# Patient Record
Sex: FEMALE | Race: OTHER | URBAN - METROPOLITAN AREA
[De-identification: names, ages, dates, MRNs, and addresses within clinical notes are randomized per-mention and may not be internally consistent; named-entity substitution may affect disease eponyms.]

---

## 2020-02-23 ENCOUNTER — EMERGENCY (EMERGENCY)
Facility: HOSPITAL | Age: 70
LOS: 1 days | Discharge: HOME | End: 2020-02-23
Attending: EMERGENCY MEDICINE | Admitting: EMERGENCY MEDICINE
Payer: SELF-PAY

## 2020-02-23 VITALS
DIASTOLIC BLOOD PRESSURE: 78 MMHG | HEIGHT: 66 IN | OXYGEN SATURATION: 98 % | RESPIRATION RATE: 18 BRPM | WEIGHT: 132.94 LBS | SYSTOLIC BLOOD PRESSURE: 146 MMHG | HEART RATE: 64 BPM | TEMPERATURE: 98 F

## 2020-02-23 LAB — GLUCOSE BLDC GLUCOMTR-MCNC: 103 MG/DL — HIGH (ref 70–99)

## 2020-02-23 PROCEDURE — 99283 EMERGENCY DEPT VISIT LOW MDM: CPT

## 2020-02-23 PROCEDURE — 99053 MED SERV 10PM-8AM 24 HR FAC: CPT

## 2020-02-23 RX ORDER — METFORMIN HYDROCHLORIDE 850 MG/1
500 TABLET ORAL ONCE
Refills: 0 | Status: COMPLETED | OUTPATIENT
Start: 2020-02-23 | End: 2020-02-23

## 2020-02-23 NOTE — ED PROVIDER NOTE - NSFOLLOWUPCLINICS_GEN_ALL_ED_FT
[facility]  Family Medicine  [address] [place] [id number]  Phone: [phone]  Fax:   Follow Up Time:

## 2020-02-23 NOTE — ED PROVIDER NOTE - OBJECTIVE STATEMENT
pt is visiting from [place] and ran out of her metformin 30 days ago, today states that she saw blood in her R eye so comes to ER, no pain, no visual changes.

## 2020-02-23 NOTE — ED ADULT TRIAGE NOTE - CHIEF COMPLAINT QUOTE
Pt c/o right eye redness and left arm pain. Pt denies fever/chills, cough. Denies headache, cp, or sob.

## 2020-02-23 NOTE — ED PROVIDER NOTE - NSFOLLOWUPINSTRUCTIONS_ED_ALL_ED_FT
Hemorragia subconjuntival    LO QUE NECESITA SABER:    Se produce ezio hemorragia subconjuntival cuando se acumula stephanie debajo de la conjuntiva del annabelle. La conjuntiva es el revestimiento transparente que cubre la parte kenyon del annabelle. La stephanie proviene de un vaso sanguíneo que se ha roto debajo de la conjuntiva.    INSTRUCCIONES SOBRE EL JUSTICE HOSPITALARIA:    Cuidado del annabelle:    Compresas frías o tibias:Use ezio compresa fría javan las primeras 24 horas. Pregunte con qué frecuencia y javan cuánto tiempo debería aplicársela cada vez. Después de las primeras 24 horas, póngase ezio compresa tibia en el annabelle. Alexander esto 3 veces al día por unos 10 a 15 minutos cada vez.      Gotas oculares:Puede que necesite lágrimas artificiales para mantener los ojos húmedos. Use las gotas ruy gabriele le indiquen.Pasos 1 2 3 4         Programe ezio nicholas de seguimiento con wells médico u oftalmólogo según indicaciones:Anote cristal preguntas para que se acuerde de hacerlas javan cristal visitas.    Comuníquese con wells médico o wells especialista del annabelle si:    Todavía tiene la rashaun aimee en el annabelle después de 3 semanas.      Tiene otra hemorragia subconjuntival.      Tiene hemorragia subconjuntival en ambos ojos.      Usted tiene preguntas o inquietudes acerca de wells condición o cuidado.    Regrese a la keaton de emergencias si:    Le duele el annabelle o tiene sensibilidad a la shefali.      Wells visión cambia.      Wells annabelle supura un líquido garcia o amarillo.

## 2020-02-24 PROCEDURE — 82962 GLUCOSE BLOOD TEST: CPT

## 2020-02-24 PROCEDURE — 99283 EMERGENCY DEPT VISIT LOW MDM: CPT

## 2020-02-24 RX ORDER — METFORMIN HYDROCHLORIDE 850 MG/1
1 TABLET ORAL
Qty: 60 | Refills: 0
Start: 2020-02-24 | End: 2020-03-24

## 2020-02-24 RX ADMIN — METFORMIN HYDROCHLORIDE 500 MILLIGRAM(S): 850 TABLET ORAL at 00:04

## 2020-02-24 NOTE — ED ADULT NURSE NOTE - OBJECTIVE STATEMENT
Pt a&ox3 came to ED c/o right eye redness. Pt denies itchiness, discharge, visual changes. Denies sneezing/coughing. Pt also states she is type 2 diabetic and ran out of metformin prescription, she is visiting from [facility].

## 2020-02-29 DIAGNOSIS — Z76.0 ENCOUNTER FOR ISSUE OF REPEAT PRESCRIPTION: ICD-10-CM

## 2022-08-19 ENCOUNTER — EMERGENCY (EMERGENCY)
Facility: HOSPITAL | Age: 72
LOS: 1 days | Discharge: HOME | End: 2022-08-19
Attending: EMERGENCY MEDICINE
Payer: MEDICAID

## 2022-08-19 VITALS
TEMPERATURE: 98 F | HEIGHT: 67 IN | RESPIRATION RATE: 20 BRPM | HEART RATE: 68 BPM | WEIGHT: 154.32 LBS | SYSTOLIC BLOOD PRESSURE: 128 MMHG | DIASTOLIC BLOOD PRESSURE: 72 MMHG | OXYGEN SATURATION: 98 %

## 2022-08-19 PROCEDURE — 99285 EMERGENCY DEPT VISIT HI MDM: CPT | Mod: 25

## 2022-08-19 PROCEDURE — 93010 ELECTROCARDIOGRAM REPORT: CPT

## 2022-08-19 NOTE — ED ADULT TRIAGE NOTE - AS HEIGHT TYPE
54-year-old female with uncontrolled hypertension, dyslipidemia, history of diverticulitis, anxiety, GERD, probable cognitive impairment, presents with word-finding difficulties, confusion, and left upper extremity weakness  Patient with word-finding difficulty and reported confusion prior to presentation, as well as left upper extremity weakness as reported by EMS  On examination in the ED, patient had mild speech difficulty with a single paraphasic error, could not state the current month or year, but had no weakness or sensory abnormality  Patient's baseline unclear as reports from patient's daughters varied  It seems she may have some difficulty with word finding at times, and may be developing some degree of cognitive impairment/forgetfulness, prompting her to move in with her daughter  Etiology of symptoms hypertensive encephalopathy versus TIA  Blood pressure on arrival was 204/99, and as high as 221 systolic in the ED  MRI brain demonstrated no acute changes  Did demonstrate scattered chronic small vessel cerebrovascular ischemic disease  CT head demonstrated no acute changes  CTA head/neck demonstrated no flow consequential stenosis or LVO  A1c 5 1  LDL 99  Patient was not on any antiplatelet or anticoagulant at baseline  Plan:  -2D echocardiogram with bubble study pending  If unremarkable, no further inpatient neurologic recommendations   -loaded with aspirin 325 mg in the ED  Continue 81 mg aspirin daily     -continue atorvastatin 40 mg   -Telemetry  -PT/OT/speech therapy   -goal normotension   -Frequent neuro checks   -Continue to monitor and notify with changes  -outpatient follow-up with Neurology has been requested 
Patient admits she has trouble recalling the year  At times with memory retrieval difficulties as well  Per discussion with her daughters, patient recently moved in with her youngest daughter as she left a pot of boiling water on and forgot about it, food items had been placed in strange locations, etc   -B12 321  Prefer level to be greater than 400  Will start B12 1000 mg p o  daily   -Folate 6 5   -RPR pending  -TSH 0 064 with free T4 1 03   -patient does not drive   -recommend full neurocognitive evaluation as outpatient 
Patient has memory retrieval difficulties  Patient recently moved in with her youngest daughter as she left a pot of boiling water on and forgot about it   B12 level 321, Folate 6 5    Plan  - Per neuro started B12 1000 mg PO daily   - Recommend full neurocognitive evaluation as outpatient
Patient noticed some word finding difficulties while watching the Olympics with her daughter  Patient was hypertensive in the 200s in the ED  She took her metoprolol this am but not the amlodipine  CT was unremarkable  ABCD2 was 4 which is moderate risk  MRI was unremarkable   Echo was normal       Plan  -- Continue 81 mg aspirin daily, Continue Lipitor 40 mg    -- F/u with PCP and Neuro outpatient
Plan:  - Continue home Metoprolol 25 BID and amlodipine 5 mg
Systolic blood pressure as high as 220 on presentation  Gradual reduction as noted above 
stated

## 2022-08-20 VITALS
OXYGEN SATURATION: 99 % | SYSTOLIC BLOOD PRESSURE: 102 MMHG | RESPIRATION RATE: 17 BRPM | TEMPERATURE: 98 F | HEART RATE: 65 BPM | DIASTOLIC BLOOD PRESSURE: 55 MMHG

## 2022-08-20 LAB
ALBUMIN SERPL ELPH-MCNC: 4.5 G/DL — SIGNIFICANT CHANGE UP (ref 3.3–5)
ALP SERPL-CCNC: 76 U/L — SIGNIFICANT CHANGE UP (ref 40–120)
ALT FLD-CCNC: 28 U/L — SIGNIFICANT CHANGE UP (ref 10–45)
ANION GAP SERPL CALC-SCNC: 15 MMOL/L — SIGNIFICANT CHANGE UP (ref 5–17)
AST SERPL-CCNC: 24 U/L — SIGNIFICANT CHANGE UP (ref 10–40)
BASE EXCESS BLDV CALC-SCNC: 1.7 MMOL/L — SIGNIFICANT CHANGE UP (ref -2–3)
BASOPHILS # BLD AUTO: 0.02 K/UL — SIGNIFICANT CHANGE UP (ref 0–0.2)
BASOPHILS NFR BLD AUTO: 0.4 % — SIGNIFICANT CHANGE UP (ref 0–2)
BILIRUB SERPL-MCNC: 1.9 MG/DL — HIGH (ref 0.2–1.2)
BUN SERPL-MCNC: 16 MG/DL — SIGNIFICANT CHANGE UP (ref 7–23)
CA-I SERPL-SCNC: 1.18 MMOL/L — SIGNIFICANT CHANGE UP (ref 1.15–1.33)
CALCIUM SERPL-MCNC: 9.6 MG/DL — SIGNIFICANT CHANGE UP (ref 8.4–10.5)
CHLORIDE BLDV-SCNC: 93 MMOL/L — LOW (ref 96–108)
CHLORIDE SERPL-SCNC: 92 MMOL/L — LOW (ref 96–108)
CO2 BLDV-SCNC: 30 MMOL/L — HIGH (ref 22–26)
CO2 SERPL-SCNC: 23 MMOL/L — SIGNIFICANT CHANGE UP (ref 22–31)
CREAT SERPL-MCNC: 0.64 MG/DL — SIGNIFICANT CHANGE UP (ref 0.5–1.3)
EGFR: 94 ML/MIN/1.73M2 — SIGNIFICANT CHANGE UP
EOSINOPHIL # BLD AUTO: 0.02 K/UL — SIGNIFICANT CHANGE UP (ref 0–0.5)
EOSINOPHIL NFR BLD AUTO: 0.4 % — SIGNIFICANT CHANGE UP (ref 0–6)
GAS PNL BLDV: 127 MMOL/L — LOW (ref 136–145)
GAS PNL BLDV: SIGNIFICANT CHANGE UP
GAS PNL BLDV: SIGNIFICANT CHANGE UP
GLUCOSE BLDV-MCNC: 115 MG/DL — HIGH (ref 70–99)
GLUCOSE SERPL-MCNC: 112 MG/DL — HIGH (ref 70–99)
HCO3 BLDV-SCNC: 28 MMOL/L — SIGNIFICANT CHANGE UP (ref 22–29)
HCT VFR BLD CALC: 42.6 % — SIGNIFICANT CHANGE UP (ref 34.5–45)
HCT VFR BLDA CALC: 46 % — SIGNIFICANT CHANGE UP (ref 34.5–46.5)
HGB BLD CALC-MCNC: 15.3 G/DL — SIGNIFICANT CHANGE UP (ref 11.7–16.1)
HGB BLD-MCNC: 15 G/DL — SIGNIFICANT CHANGE UP (ref 11.5–15.5)
IMM GRANULOCYTES NFR BLD AUTO: 0.2 % — SIGNIFICANT CHANGE UP (ref 0–1.5)
LACTATE BLDV-MCNC: 2 MMOL/L — SIGNIFICANT CHANGE UP (ref 0.5–2)
LIDOCAIN IGE QN: 31 U/L — SIGNIFICANT CHANGE UP (ref 7–60)
LYMPHOCYTES # BLD AUTO: 0.77 K/UL — LOW (ref 1–3.3)
LYMPHOCYTES # BLD AUTO: 14 % — SIGNIFICANT CHANGE UP (ref 13–44)
MCHC RBC-ENTMCNC: 29.8 PG — SIGNIFICANT CHANGE UP (ref 27–34)
MCHC RBC-ENTMCNC: 35.2 GM/DL — SIGNIFICANT CHANGE UP (ref 32–36)
MCV RBC AUTO: 84.5 FL — SIGNIFICANT CHANGE UP (ref 80–100)
MONOCYTES # BLD AUTO: 0.25 K/UL — SIGNIFICANT CHANGE UP (ref 0–0.9)
MONOCYTES NFR BLD AUTO: 4.6 % — SIGNIFICANT CHANGE UP (ref 2–14)
NEUTROPHILS # BLD AUTO: 4.42 K/UL — SIGNIFICANT CHANGE UP (ref 1.8–7.4)
NEUTROPHILS NFR BLD AUTO: 80.4 % — HIGH (ref 43–77)
NRBC # BLD: 0 /100 WBCS — SIGNIFICANT CHANGE UP (ref 0–0)
PCO2 BLDV: 50 MMHG — HIGH (ref 39–42)
PH BLDV: 7.36 — SIGNIFICANT CHANGE UP (ref 7.32–7.43)
PLATELET # BLD AUTO: 191 K/UL — SIGNIFICANT CHANGE UP (ref 150–400)
PO2 BLDV: 25 MMHG — SIGNIFICANT CHANGE UP (ref 25–45)
POTASSIUM BLDV-SCNC: 4.2 MMOL/L — SIGNIFICANT CHANGE UP (ref 3.5–5.1)
POTASSIUM SERPL-MCNC: 4.5 MMOL/L — SIGNIFICANT CHANGE UP (ref 3.5–5.3)
POTASSIUM SERPL-SCNC: 4.5 MMOL/L — SIGNIFICANT CHANGE UP (ref 3.5–5.3)
PROT SERPL-MCNC: 7.4 G/DL — SIGNIFICANT CHANGE UP (ref 6–8.3)
RBC # BLD: 5.04 M/UL — SIGNIFICANT CHANGE UP (ref 3.8–5.2)
RBC # FLD: 14.2 % — SIGNIFICANT CHANGE UP (ref 10.3–14.5)
SAO2 % BLDV: 32.4 % — LOW (ref 67–88)
SODIUM SERPL-SCNC: 130 MMOL/L — LOW (ref 135–145)
WBC # BLD: 5.49 K/UL — SIGNIFICANT CHANGE UP (ref 3.8–10.5)
WBC # FLD AUTO: 5.49 K/UL — SIGNIFICANT CHANGE UP (ref 3.8–10.5)

## 2022-08-20 PROCEDURE — 84295 ASSAY OF SERUM SODIUM: CPT

## 2022-08-20 PROCEDURE — 85018 HEMOGLOBIN: CPT

## 2022-08-20 PROCEDURE — 96374 THER/PROPH/DIAG INJ IV PUSH: CPT

## 2022-08-20 PROCEDURE — 85025 COMPLETE CBC W/AUTO DIFF WBC: CPT

## 2022-08-20 PROCEDURE — 99285 EMERGENCY DEPT VISIT HI MDM: CPT | Mod: 25

## 2022-08-20 PROCEDURE — 82330 ASSAY OF CALCIUM: CPT

## 2022-08-20 PROCEDURE — 93005 ELECTROCARDIOGRAM TRACING: CPT

## 2022-08-20 PROCEDURE — 80053 COMPREHEN METABOLIC PANEL: CPT

## 2022-08-20 PROCEDURE — 85014 HEMATOCRIT: CPT

## 2022-08-20 PROCEDURE — 82803 BLOOD GASES ANY COMBINATION: CPT

## 2022-08-20 PROCEDURE — 82962 GLUCOSE BLOOD TEST: CPT

## 2022-08-20 PROCEDURE — 83605 ASSAY OF LACTIC ACID: CPT

## 2022-08-20 PROCEDURE — 83690 ASSAY OF LIPASE: CPT

## 2022-08-20 PROCEDURE — 82435 ASSAY OF BLOOD CHLORIDE: CPT

## 2022-08-20 PROCEDURE — 71045 X-RAY EXAM CHEST 1 VIEW: CPT | Mod: 26

## 2022-08-20 PROCEDURE — 84132 ASSAY OF SERUM POTASSIUM: CPT

## 2022-08-20 PROCEDURE — 82947 ASSAY GLUCOSE BLOOD QUANT: CPT

## 2022-08-20 PROCEDURE — 71045 X-RAY EXAM CHEST 1 VIEW: CPT

## 2022-08-20 PROCEDURE — 36415 COLL VENOUS BLD VENIPUNCTURE: CPT

## 2022-08-20 RX ORDER — ACETAMINOPHEN 500 MG
650 TABLET ORAL ONCE
Refills: 0 | Status: COMPLETED | OUTPATIENT
Start: 2022-08-20 | End: 2022-08-20

## 2022-08-20 RX ORDER — SODIUM CHLORIDE 9 MG/ML
1000 INJECTION INTRAMUSCULAR; INTRAVENOUS; SUBCUTANEOUS ONCE
Refills: 0 | Status: COMPLETED | OUTPATIENT
Start: 2022-08-20 | End: 2022-08-20

## 2022-08-20 RX ORDER — ONDANSETRON 8 MG/1
4 TABLET, FILM COATED ORAL ONCE
Refills: 0 | Status: COMPLETED | OUTPATIENT
Start: 2022-08-20 | End: 2022-08-20

## 2022-08-20 RX ADMIN — SODIUM CHLORIDE 1000 MILLILITER(S): 9 INJECTION INTRAMUSCULAR; INTRAVENOUS; SUBCUTANEOUS at 00:32

## 2022-08-20 RX ADMIN — ONDANSETRON 4 MILLIGRAM(S): 8 TABLET, FILM COATED ORAL at 00:32

## 2022-08-20 NOTE — ED ADULT NURSE REASSESSMENT NOTE - NS ED NURSE REASSESS COMMENT FT1
Report received from ANATOLIY Larsen. Pt a & o x 4, able to follow commands. Breathing spontaneous & nonlabored. Pt provided with food for PO challenge

## 2022-08-20 NOTE — ED PROVIDER NOTE - PROGRESS NOTE DETAILS
Connor Rush, PGY2 Pt labs show no acute issues. pt po challenge and continues to feel well. will dc.

## 2022-08-20 NOTE — ED PROVIDER NOTE - PATIENT PORTAL LINK FT
You can access the FollowMyHealth Patient Portal offered by [facility] by registering at the following website: [email or web address]. By joining [employer]’s FollowMyHealth portal, you will also be able to view your health information using other applications (apps) compatible with our system.

## 2022-08-20 NOTE — ED ADULT NURSE NOTE - OBJECTIVE STATEMENT
Pt is a 72y F no sig PMH p/w 1 day of N/V and chills. Reports multiple episodes of non-bloody emesis. Pt denies abdominal pain, diarrhea, chest pain, SOB, fevers. A&Ox4, CRAIN, lungs clear, distal pulses intact, abdomen soft, skin intact. Side rails up for safety, call bell and personal items within reach, instructed to call for assistance, verbalizes understanding. Will continue to monitor.

## 2022-08-20 NOTE — ED PROVIDER NOTE - NSFOLLOWUPINSTRUCTIONS_ED_ALL_ED_FT
Your blood work shows no abnormalities.   Please see your primary doctor in 2-3 days for follow-up care. Return to ER for any new or worsening symptoms including chest pain, abdominal pain, or passing out.

## 2022-08-20 NOTE — ED PROVIDER NOTE - PHYSICAL EXAMINATION
Const: Well-nourished, Well-developed, appearing stated age.  Eyes: no conjunctival injection, and symmetrical lids.  HEENT: Head NCAT, no lesions. Atraumatic external nose and ears. Moist MM.  Neck: Symmetric, trachea midline.   CVS: +S1/S2,   RESP: Unlabored respiratory effort. Clear to auscultation bilaterally.  GI: Nontender/Nondistended,   MSK: Normocephalic/Atraumatic, Lower Extremities w/o calf tenderness or edema b/l.   Skin: Warm, dry and intact.   Neuro: . Motor & Sensation grossly intact.  Psych: Awake, Alert, & Oriented (AAO) x3. Appropriate mood and affect.

## 2022-08-20 NOTE — ED PROVIDER NOTE - ATTENDING CONTRIBUTION TO CARE
Afebrile. Awake and Alert. Lungs CTA. Heart RRR. Abdomen soft NTND. CN II-XII grossly intact. Moves all extremities without lateralization.    N/V  [place] abdominal exam  r/o DKA  r/o food poisoning Afebrile. Awake and Alert. Lungs CTA. Heart RRR. Abdomen soft NTND. CN II-XII grossly intact. Moves all extremities without lateralization.    N/V  [place] abdominal exam  r/o DKA  r/o food poisoning  r/o GERD

## 2022-08-20 NOTE — ED PROVIDER NOTE - OBJECTIVE STATEMENT
Pt is a 73 yo f with hx of dm who presents to ED for non bloody vomiting x7 today. Pt thinks she ate something bad that made her vomit.  No f/c, no abdominal pain, cp, or sob. No diarrhea. Pt is currently not vomiting. On metformin, no insulin.

## 2022-08-20 NOTE — ED ADULT NURSE NOTE - CAS DISCH CONDITION
Problem: Communication  Goal: The ability to communicate needs accurately and effectively will improve  Outcome: PROGRESSING AS EXPECTED     Problem: Safety  Goal: Will remain free from injury  Outcome: PROGRESSING AS EXPECTED  Goal: Will remain free from falls  Outcome: PROGRESSING AS EXPECTED     Problem: Infection  Goal: Will remain free from infection  Outcome: PROGRESSING AS EXPECTED     Problem: Bowel/Gastric:  Goal: Normal bowel function is maintained or improved  Outcome: PROGRESSING AS EXPECTED     Problem: Knowledge Deficit  Goal: Knowledge of disease process/condition, treatment plan, diagnostic tests, and medications will improve  Outcome: PROGRESSING AS EXPECTED      Stable

## 2022-08-20 NOTE — ED PROVIDER NOTE - NSICDXFAMILYHX_GEN_ALL_CORE_FT
FAMILY HISTORY:  Father  Still living? Unknown  HTN (hypertension), Age at diagnosis: Age Unknown    Mother  Still living? Yes, Estimated age: Age Unknown  HTN (hypertension), Age at diagnosis: Age Unknown

## 2022-08-31 NOTE — ED ADULT NURSE NOTE - NS PRO AD BILL OF RIGHTS
on the discharge service for the patient. I have reviewed and made amendments to the documentation where necessary. Yes

## 2023-02-11 ENCOUNTER — EMERGENCY (EMERGENCY)
Facility: HOSPITAL | Age: 73
LOS: 0 days | Discharge: HOME | End: 2023-02-11
Attending: STUDENT IN AN ORGANIZED HEALTH CARE EDUCATION/TRAINING PROGRAM
Payer: MEDICAID

## 2023-02-11 VITALS
DIASTOLIC BLOOD PRESSURE: 80 MMHG | WEIGHT: 119.93 LBS | RESPIRATION RATE: 16 BRPM | OXYGEN SATURATION: 98 % | HEIGHT: 59.06 IN | HEART RATE: 80 BPM | TEMPERATURE: 98 F | SYSTOLIC BLOOD PRESSURE: 145 MMHG

## 2023-02-11 DIAGNOSIS — H57.11 OCULAR PAIN, RIGHT EYE: ICD-10-CM
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
## 2023-02-11 DIAGNOSIS — Z79.84 LONG TERM (CURRENT) USE OF ORAL HYPOGLYCEMIC DRUGS: ICD-10-CM

## 2023-02-11 DIAGNOSIS — Z79.4 LONG TERM (CURRENT) USE OF INSULIN: ICD-10-CM

## 2023-02-11 LAB — GLUCOSE BLDC GLUCOMTR-MCNC: 147 MG/DL — HIGH (ref 70–99)

## 2023-02-11 PROCEDURE — 99284 EMERGENCY DEPT VISIT MOD MDM: CPT

## 2023-02-11 NOTE — ED ADULT TRIAGE NOTE - CHIEF COMPLAINT QUOTE
patient aox4. ambulatory, she c/o right eye redness, pain, itching x 1 day. denies vision changes or injury. pmhx: DM

## 2023-02-11 NOTE — ED PROVIDER NOTE - OBJECTIVE STATEMENT
73 y/o F presenting to the ED c/o R eye pain. Patient states she accidentally used antifungal drops ("timol") in her R eye instead of her lubricating drops yesterday. Reports pain and redness, which has slowly resolved. No blurry or changes in vision. No headache or other acute symptoms.

## 2023-02-11 NOTE — ED PROVIDER NOTE - PHYSICAL EXAMINATION
GENERAL: Awake, alert, NAD  HEENT: NC/AT, moist mucous membranes, PERRL, EOMI, right sclera is mildly injected, visual acuity intact b/l  LUNGS: CTAB, no wheezes or crackles   CARDIAC: RRR, no m/r/g  EXT: No edema, no calf tenderness, 2+ DP pulses bilaterally, no deformities.  NEURO: A&Ox3. Moving all extremities.  SKIN: Warm and dry. No rash.  PSYCH: Normal affect.

## 2023-02-11 NOTE — ED PROVIDER NOTE - NSFOLLOWUPCLINICS_GEN_ALL_ED_FT
[facility] - Ophthalmology  Ophthalmology  [address], [facility] [address]  [place] [address]  Phone: [phone]  Fax:   Follow Up Time: 1-3 Days

## 2023-02-11 NOTE — ED PROVIDER NOTE - NSFOLLOWUPINSTRUCTIONS_ED_ALL_ED_FT
Follow up with ophthalmology in the office.    You should use erythromycin ointment 3x daily.    Use motrin as needed for pain.    Return for any new or worsening symptoms.

## 2023-02-11 NOTE — ED PROVIDER NOTE - NS ED ROS FT
CONST: no fevers, no chills  EYES: + pain, no vision changes  ENT: no sore throat, no ear pain, no change in hearing  CV: no chest pain, no leg swelling  RESP: no shortness of breath, no cough  ABD: no abdominal pain, no nausea, no vomiting, no diarrhea  : no dysuria, no flank pain, no hematuria  MSK: no back pain, no extremity pain  NEURO: no headache or additional neurologic complaints  HEME: no easy bleeding  SKIN:  no rash

## 2023-02-11 NOTE — ED PROVIDER NOTE - PATIENT PORTAL LINK FT
You can access the FollowMyHealth Patient Portal offered by [facility] by registering at the following website: [email or web address]. By joining [employer]’s FollowMyHealth portal, you will also be able to view your health information using other applications (apps) compatible with our system.

## 2023-02-11 NOTE — ED PROVIDER NOTE - CLINICAL SUMMARY MEDICAL DECISION MAKING FREE TEXT BOX
73 y/o F presenting to the ED with R eye pain.  Vitals stable.  She is well appearing in NAD.  The right eye has mildly injected sclera. The pupil is ERRL. The visual acuity is intact.  Likely reaction to antifungal that was placed yesterday over 24 hours ago.  Will cover with erythromycin ointment to prevent any infection. Instructed to f/u with ophtho.

## 2023-02-12 PROBLEM — E11.9 TYPE 2 DIABETES MELLITUS WITHOUT COMPLICATIONS: Chronic | Status: ACTIVE | Noted: 2020-02-24

## 2024-01-09 ENCOUNTER — EMERGENCY (EMERGENCY)
Facility: HOSPITAL | Age: 74
LOS: 1 days | Discharge: HOME | End: 2024-01-09
Attending: EMERGENCY MEDICINE
Payer: MEDICAID

## 2024-01-09 VITALS
HEART RATE: 76 BPM | RESPIRATION RATE: 16 BRPM | DIASTOLIC BLOOD PRESSURE: 84 MMHG | WEIGHT: 127.87 LBS | TEMPERATURE: 99 F | HEIGHT: 65.35 IN | SYSTOLIC BLOOD PRESSURE: 136 MMHG | OXYGEN SATURATION: 96 %

## 2024-01-09 VITALS
HEART RATE: 56 BPM | OXYGEN SATURATION: 99 % | SYSTOLIC BLOOD PRESSURE: 153 MMHG | DIASTOLIC BLOOD PRESSURE: 78 MMHG | RESPIRATION RATE: 18 BRPM

## 2024-01-09 LAB
ALBUMIN SERPL ELPH-MCNC: 4.5 G/DL — SIGNIFICANT CHANGE UP (ref 3.3–5)
ALBUMIN SERPL ELPH-MCNC: 4.5 G/DL — SIGNIFICANT CHANGE UP (ref 3.3–5)
ALP SERPL-CCNC: 74 U/L — SIGNIFICANT CHANGE UP (ref 40–120)
ALP SERPL-CCNC: 74 U/L — SIGNIFICANT CHANGE UP (ref 40–120)
ALT FLD-CCNC: 23 U/L — SIGNIFICANT CHANGE UP (ref 10–45)
ALT FLD-CCNC: 23 U/L — SIGNIFICANT CHANGE UP (ref 10–45)
ANION GAP SERPL CALC-SCNC: 11 MMOL/L — SIGNIFICANT CHANGE UP (ref 5–17)
ANION GAP SERPL CALC-SCNC: 11 MMOL/L — SIGNIFICANT CHANGE UP (ref 5–17)
APPEARANCE UR: CLEAR — SIGNIFICANT CHANGE UP
APPEARANCE UR: CLEAR — SIGNIFICANT CHANGE UP
AST SERPL-CCNC: 28 U/L — SIGNIFICANT CHANGE UP (ref 10–40)
AST SERPL-CCNC: 28 U/L — SIGNIFICANT CHANGE UP (ref 10–40)
BACTERIA # UR AUTO: NEGATIVE /HPF — SIGNIFICANT CHANGE UP
BACTERIA # UR AUTO: NEGATIVE /HPF — SIGNIFICANT CHANGE UP
BASE EXCESS BLDV CALC-SCNC: 1.3 MMOL/L — SIGNIFICANT CHANGE UP (ref -2–3)
BASE EXCESS BLDV CALC-SCNC: 1.3 MMOL/L — SIGNIFICANT CHANGE UP (ref -2–3)
BASOPHILS # BLD AUTO: 0.05 K/UL — SIGNIFICANT CHANGE UP (ref 0–0.2)
BASOPHILS # BLD AUTO: 0.05 K/UL — SIGNIFICANT CHANGE UP (ref 0–0.2)
BASOPHILS NFR BLD AUTO: 1.7 % — SIGNIFICANT CHANGE UP (ref 0–2)
BASOPHILS NFR BLD AUTO: 1.7 % — SIGNIFICANT CHANGE UP (ref 0–2)
BILIRUB SERPL-MCNC: 1 MG/DL — SIGNIFICANT CHANGE UP (ref 0.2–1.2)
BILIRUB SERPL-MCNC: 1 MG/DL — SIGNIFICANT CHANGE UP (ref 0.2–1.2)
BILIRUB UR-MCNC: NEGATIVE — SIGNIFICANT CHANGE UP
BILIRUB UR-MCNC: NEGATIVE — SIGNIFICANT CHANGE UP
BUN SERPL-MCNC: 25 MG/DL — HIGH (ref 7–23)
BUN SERPL-MCNC: 25 MG/DL — HIGH (ref 7–23)
CA-I SERPL-SCNC: 1.27 MMOL/L — SIGNIFICANT CHANGE UP (ref 1.15–1.33)
CA-I SERPL-SCNC: 1.27 MMOL/L — SIGNIFICANT CHANGE UP (ref 1.15–1.33)
CALCIUM SERPL-MCNC: 9.3 MG/DL — SIGNIFICANT CHANGE UP (ref 8.4–10.5)
CALCIUM SERPL-MCNC: 9.3 MG/DL — SIGNIFICANT CHANGE UP (ref 8.4–10.5)
CAST: 0 /LPF — SIGNIFICANT CHANGE UP (ref 0–4)
CAST: 0 /LPF — SIGNIFICANT CHANGE UP (ref 0–4)
CHLORIDE BLDV-SCNC: 103 MMOL/L — SIGNIFICANT CHANGE UP (ref 96–108)
CHLORIDE BLDV-SCNC: 103 MMOL/L — SIGNIFICANT CHANGE UP (ref 96–108)
CHLORIDE SERPL-SCNC: 104 MMOL/L — SIGNIFICANT CHANGE UP (ref 96–108)
CHLORIDE SERPL-SCNC: 104 MMOL/L — SIGNIFICANT CHANGE UP (ref 96–108)
CO2 BLDV-SCNC: 30 MMOL/L — HIGH (ref 22–26)
CO2 BLDV-SCNC: 30 MMOL/L — HIGH (ref 22–26)
CO2 SERPL-SCNC: 22 MMOL/L — SIGNIFICANT CHANGE UP (ref 22–31)
CO2 SERPL-SCNC: 22 MMOL/L — SIGNIFICANT CHANGE UP (ref 22–31)
COLOR SPEC: YELLOW — SIGNIFICANT CHANGE UP
COLOR SPEC: YELLOW — SIGNIFICANT CHANGE UP
CREAT SERPL-MCNC: 0.75 MG/DL — SIGNIFICANT CHANGE UP (ref 0.5–1.3)
CREAT SERPL-MCNC: 0.75 MG/DL — SIGNIFICANT CHANGE UP (ref 0.5–1.3)
DIFF PNL FLD: ABNORMAL
DIFF PNL FLD: ABNORMAL
EGFR: 84 ML/MIN/1.73M2 — SIGNIFICANT CHANGE UP
EGFR: 84 ML/MIN/1.73M2 — SIGNIFICANT CHANGE UP
EOSINOPHIL # BLD AUTO: 0.11 K/UL — SIGNIFICANT CHANGE UP (ref 0–0.5)
EOSINOPHIL # BLD AUTO: 0.11 K/UL — SIGNIFICANT CHANGE UP (ref 0–0.5)
EOSINOPHIL NFR BLD AUTO: 3.5 % — SIGNIFICANT CHANGE UP (ref 0–6)
EOSINOPHIL NFR BLD AUTO: 3.5 % — SIGNIFICANT CHANGE UP (ref 0–6)
GAS PNL BLDV: 134 MMOL/L — LOW (ref 136–145)
GAS PNL BLDV: 134 MMOL/L — LOW (ref 136–145)
GAS PNL BLDV: SIGNIFICANT CHANGE UP
GLUCOSE BLDV-MCNC: 131 MG/DL — HIGH (ref 70–99)
GLUCOSE BLDV-MCNC: 131 MG/DL — HIGH (ref 70–99)
GLUCOSE SERPL-MCNC: 127 MG/DL — HIGH (ref 70–99)
GLUCOSE SERPL-MCNC: 127 MG/DL — HIGH (ref 70–99)
GLUCOSE UR QL: 500 MG/DL
GLUCOSE UR QL: 500 MG/DL
HCO3 BLDV-SCNC: 28 MMOL/L — SIGNIFICANT CHANGE UP (ref 22–29)
HCO3 BLDV-SCNC: 28 MMOL/L — SIGNIFICANT CHANGE UP (ref 22–29)
HCT VFR BLD CALC: 43.6 % — SIGNIFICANT CHANGE UP (ref 34.5–45)
HCT VFR BLD CALC: 43.6 % — SIGNIFICANT CHANGE UP (ref 34.5–45)
HCT VFR BLDA CALC: 46 % — SIGNIFICANT CHANGE UP (ref 34.5–46.5)
HCT VFR BLDA CALC: 46 % — SIGNIFICANT CHANGE UP (ref 34.5–46.5)
HGB BLD CALC-MCNC: 15.4 G/DL — SIGNIFICANT CHANGE UP (ref 11.7–16.1)
HGB BLD CALC-MCNC: 15.4 G/DL — SIGNIFICANT CHANGE UP (ref 11.7–16.1)
HGB BLD-MCNC: 14.7 G/DL — SIGNIFICANT CHANGE UP (ref 11.5–15.5)
HGB BLD-MCNC: 14.7 G/DL — SIGNIFICANT CHANGE UP (ref 11.5–15.5)
KETONES UR-MCNC: NEGATIVE MG/DL — SIGNIFICANT CHANGE UP
KETONES UR-MCNC: NEGATIVE MG/DL — SIGNIFICANT CHANGE UP
LACTATE BLDV-MCNC: 1.1 MMOL/L — SIGNIFICANT CHANGE UP (ref 0.5–2)
LACTATE BLDV-MCNC: 1.1 MMOL/L — SIGNIFICANT CHANGE UP (ref 0.5–2)
LEUKOCYTE ESTERASE UR-ACNC: ABNORMAL
LEUKOCYTE ESTERASE UR-ACNC: ABNORMAL
LYMPHOCYTES # BLD AUTO: 1.1 K/UL — SIGNIFICANT CHANGE UP (ref 1–3.3)
LYMPHOCYTES # BLD AUTO: 1.1 K/UL — SIGNIFICANT CHANGE UP (ref 1–3.3)
LYMPHOCYTES # BLD AUTO: 35.7 % — SIGNIFICANT CHANGE UP (ref 13–44)
LYMPHOCYTES # BLD AUTO: 35.7 % — SIGNIFICANT CHANGE UP (ref 13–44)
MAGNESIUM SERPL-MCNC: 2.3 MG/DL — SIGNIFICANT CHANGE UP (ref 1.6–2.6)
MAGNESIUM SERPL-MCNC: 2.3 MG/DL — SIGNIFICANT CHANGE UP (ref 1.6–2.6)
MANUAL SMEAR VERIFICATION: SIGNIFICANT CHANGE UP
MANUAL SMEAR VERIFICATION: SIGNIFICANT CHANGE UP
MCHC RBC-ENTMCNC: 29.6 PG — SIGNIFICANT CHANGE UP (ref 27–34)
MCHC RBC-ENTMCNC: 29.6 PG — SIGNIFICANT CHANGE UP (ref 27–34)
MCHC RBC-ENTMCNC: 33.7 GM/DL — SIGNIFICANT CHANGE UP (ref 32–36)
MCHC RBC-ENTMCNC: 33.7 GM/DL — SIGNIFICANT CHANGE UP (ref 32–36)
MCV RBC AUTO: 87.9 FL — SIGNIFICANT CHANGE UP (ref 80–100)
MCV RBC AUTO: 87.9 FL — SIGNIFICANT CHANGE UP (ref 80–100)
MONOCYTES # BLD AUTO: 0.27 K/UL — SIGNIFICANT CHANGE UP (ref 0–0.9)
MONOCYTES # BLD AUTO: 0.27 K/UL — SIGNIFICANT CHANGE UP (ref 0–0.9)
MONOCYTES NFR BLD AUTO: 8.7 % — SIGNIFICANT CHANGE UP (ref 2–14)
MONOCYTES NFR BLD AUTO: 8.7 % — SIGNIFICANT CHANGE UP (ref 2–14)
NEUTROPHILS # BLD AUTO: 1.42 K/UL — LOW (ref 1.8–7.4)
NEUTROPHILS # BLD AUTO: 1.42 K/UL — LOW (ref 1.8–7.4)
NEUTROPHILS NFR BLD AUTO: 46.1 % — SIGNIFICANT CHANGE UP (ref 43–77)
NEUTROPHILS NFR BLD AUTO: 46.1 % — SIGNIFICANT CHANGE UP (ref 43–77)
NITRITE UR-MCNC: NEGATIVE — SIGNIFICANT CHANGE UP
NITRITE UR-MCNC: NEGATIVE — SIGNIFICANT CHANGE UP
NT-PROBNP SERPL-SCNC: 127 PG/ML — SIGNIFICANT CHANGE UP (ref 0–300)
NT-PROBNP SERPL-SCNC: 127 PG/ML — SIGNIFICANT CHANGE UP (ref 0–300)
PCO2 BLDV: 54 MMHG — HIGH (ref 39–42)
PCO2 BLDV: 54 MMHG — HIGH (ref 39–42)
PH BLDV: 7.33 — SIGNIFICANT CHANGE UP (ref 7.32–7.43)
PH BLDV: 7.33 — SIGNIFICANT CHANGE UP (ref 7.32–7.43)
PH UR: 6.5 — SIGNIFICANT CHANGE UP (ref 5–8)
PH UR: 6.5 — SIGNIFICANT CHANGE UP (ref 5–8)
PLAT MORPH BLD: NORMAL — SIGNIFICANT CHANGE UP
PLAT MORPH BLD: NORMAL — SIGNIFICANT CHANGE UP
PLATELET # BLD AUTO: 188 K/UL — SIGNIFICANT CHANGE UP (ref 150–400)
PLATELET # BLD AUTO: 188 K/UL — SIGNIFICANT CHANGE UP (ref 150–400)
PO2 BLDV: 30 MMHG — SIGNIFICANT CHANGE UP (ref 25–45)
PO2 BLDV: 30 MMHG — SIGNIFICANT CHANGE UP (ref 25–45)
POTASSIUM BLDV-SCNC: 5.6 MMOL/L — HIGH (ref 3.5–5.1)
POTASSIUM BLDV-SCNC: 5.6 MMOL/L — HIGH (ref 3.5–5.1)
POTASSIUM SERPL-MCNC: 4.6 MMOL/L — SIGNIFICANT CHANGE UP (ref 3.5–5.3)
POTASSIUM SERPL-MCNC: 4.6 MMOL/L — SIGNIFICANT CHANGE UP (ref 3.5–5.3)
POTASSIUM SERPL-SCNC: 4.6 MMOL/L — SIGNIFICANT CHANGE UP (ref 3.5–5.3)
POTASSIUM SERPL-SCNC: 4.6 MMOL/L — SIGNIFICANT CHANGE UP (ref 3.5–5.3)
PROT SERPL-MCNC: 7.4 G/DL — SIGNIFICANT CHANGE UP (ref 6–8.3)
PROT SERPL-MCNC: 7.4 G/DL — SIGNIFICANT CHANGE UP (ref 6–8.3)
PROT UR-MCNC: NEGATIVE MG/DL — SIGNIFICANT CHANGE UP
PROT UR-MCNC: NEGATIVE MG/DL — SIGNIFICANT CHANGE UP
RBC # BLD: 4.96 M/UL — SIGNIFICANT CHANGE UP (ref 3.8–5.2)
RBC # BLD: 4.96 M/UL — SIGNIFICANT CHANGE UP (ref 3.8–5.2)
RBC # FLD: 14.3 % — SIGNIFICANT CHANGE UP (ref 10.3–14.5)
RBC # FLD: 14.3 % — SIGNIFICANT CHANGE UP (ref 10.3–14.5)
RBC BLD AUTO: SIGNIFICANT CHANGE UP
RBC BLD AUTO: SIGNIFICANT CHANGE UP
RBC CASTS # UR COMP ASSIST: 2 /HPF — SIGNIFICANT CHANGE UP (ref 0–4)
RBC CASTS # UR COMP ASSIST: 2 /HPF — SIGNIFICANT CHANGE UP (ref 0–4)
SAO2 % BLDV: 47.7 % — LOW (ref 67–88)
SAO2 % BLDV: 47.7 % — LOW (ref 67–88)
SODIUM SERPL-SCNC: 137 MMOL/L — SIGNIFICANT CHANGE UP (ref 135–145)
SODIUM SERPL-SCNC: 137 MMOL/L — SIGNIFICANT CHANGE UP (ref 135–145)
SP GR SPEC: 1.02 — SIGNIFICANT CHANGE UP (ref 1–1.03)
SP GR SPEC: 1.02 — SIGNIFICANT CHANGE UP (ref 1–1.03)
SQUAMOUS # UR AUTO: 0 /HPF — SIGNIFICANT CHANGE UP (ref 0–5)
SQUAMOUS # UR AUTO: 0 /HPF — SIGNIFICANT CHANGE UP (ref 0–5)
TROPONIN T, HIGH SENSITIVITY RESULT: 7 NG/L — SIGNIFICANT CHANGE UP (ref 0–51)
TROPONIN T, HIGH SENSITIVITY RESULT: 7 NG/L — SIGNIFICANT CHANGE UP (ref 0–51)
TROPONIN T, HIGH SENSITIVITY RESULT: 8 NG/L — SIGNIFICANT CHANGE UP (ref 0–51)
TROPONIN T, HIGH SENSITIVITY RESULT: 8 NG/L — SIGNIFICANT CHANGE UP (ref 0–51)
UROBILINOGEN FLD QL: 0.2 MG/DL — SIGNIFICANT CHANGE UP (ref 0.2–1)
UROBILINOGEN FLD QL: 0.2 MG/DL — SIGNIFICANT CHANGE UP (ref 0.2–1)
VARIANT LYMPHS # BLD: 4.3 % — SIGNIFICANT CHANGE UP (ref 0–6)
VARIANT LYMPHS # BLD: 4.3 % — SIGNIFICANT CHANGE UP (ref 0–6)
WBC # BLD: 3.09 K/UL — LOW (ref 3.8–10.5)
WBC # BLD: 3.09 K/UL — LOW (ref 3.8–10.5)
WBC # FLD AUTO: 3.09 K/UL — LOW (ref 3.8–10.5)
WBC # FLD AUTO: 3.09 K/UL — LOW (ref 3.8–10.5)
WBC UR QL: 2 /HPF — SIGNIFICANT CHANGE UP (ref 0–5)
WBC UR QL: 2 /HPF — SIGNIFICANT CHANGE UP (ref 0–5)

## 2024-01-09 PROCEDURE — 93005 ELECTROCARDIOGRAM TRACING: CPT

## 2024-01-09 PROCEDURE — 81001 URINALYSIS AUTO W/SCOPE: CPT

## 2024-01-09 PROCEDURE — 85025 COMPLETE CBC W/AUTO DIFF WBC: CPT

## 2024-01-09 PROCEDURE — 82330 ASSAY OF CALCIUM: CPT

## 2024-01-09 PROCEDURE — 83605 ASSAY OF LACTIC ACID: CPT

## 2024-01-09 PROCEDURE — 84295 ASSAY OF SERUM SODIUM: CPT

## 2024-01-09 PROCEDURE — 80053 COMPREHEN METABOLIC PANEL: CPT

## 2024-01-09 PROCEDURE — 84484 ASSAY OF TROPONIN QUANT: CPT

## 2024-01-09 PROCEDURE — 87086 URINE CULTURE/COLONY COUNT: CPT

## 2024-01-09 PROCEDURE — 82947 ASSAY GLUCOSE BLOOD QUANT: CPT

## 2024-01-09 PROCEDURE — 82962 GLUCOSE BLOOD TEST: CPT

## 2024-01-09 PROCEDURE — 84132 ASSAY OF SERUM POTASSIUM: CPT

## 2024-01-09 PROCEDURE — 99284 EMERGENCY DEPT VISIT MOD MDM: CPT

## 2024-01-09 PROCEDURE — 71045 X-RAY EXAM CHEST 1 VIEW: CPT

## 2024-01-09 PROCEDURE — 99285 EMERGENCY DEPT VISIT HI MDM: CPT | Mod: 25

## 2024-01-09 PROCEDURE — 83735 ASSAY OF MAGNESIUM: CPT

## 2024-01-09 PROCEDURE — 85014 HEMATOCRIT: CPT

## 2024-01-09 PROCEDURE — 83880 ASSAY OF NATRIURETIC PEPTIDE: CPT

## 2024-01-09 PROCEDURE — 82435 ASSAY OF BLOOD CHLORIDE: CPT

## 2024-01-09 PROCEDURE — 82803 BLOOD GASES ANY COMBINATION: CPT

## 2024-01-09 PROCEDURE — 71045 X-RAY EXAM CHEST 1 VIEW: CPT | Mod: 26

## 2024-01-09 PROCEDURE — 85018 HEMOGLOBIN: CPT

## 2024-01-09 RX ORDER — ATORVASTATIN CALCIUM 80 MG/1
1 TABLET, FILM COATED ORAL
Qty: 21 | Refills: 0
Start: 2024-01-09 | End: 2024-01-29

## 2024-01-09 RX ORDER — ACETAMINOPHEN 500 MG
650 TABLET ORAL ONCE
Refills: 0 | Status: COMPLETED | OUTPATIENT
Start: 2024-01-09 | End: 2024-01-09

## 2024-01-09 RX ORDER — METFORMIN HYDROCHLORIDE 850 MG/1
1 TABLET ORAL
Qty: 42 | Refills: 0
Start: 2024-01-09 | End: 2024-01-29

## 2024-01-09 RX ADMIN — Medication 650 MILLIGRAM(S): at 18:49

## 2024-01-09 NOTE — ED ADULT NURSE NOTE - OBJECTIVE STATEMENT
72 y/o F A&Ox4 Libyan speaking w/ PMH of HLD and DM presents to ED complaining of dizziness. Pt states that she has been experiencing a few days of dizziness after running out of her atorvastatin and Jardiance medications. Pt denies n/v/d, fever, sob, chest pain. Pt states that the medications are too expensive so she has not had them filled. Pt is well appearing. Pt states she is able to walk independently. Pt VSS at this time. 72 y/o F A&Ox4 Brazilian speaking w/ PMH of HLD and DM presents to ED complaining of dizziness. Pt states that she has been experiencing a few days of dizziness after running out of her atorvastatin and Jardiance medications. Pt denies n/v/d, fever, sob, chest pain. Pt states that the medications are too expensive so she has not had them filled. Pt is well appearing. Pt states she is able to walk independently. Pt VSS at this time.

## 2024-01-09 NOTE — ED ADULT NURSE NOTE - BEFAST FACE
Hospital Outpatient Visit on 08/09/2018   Component Date Value Ref Range Status    Glucose 08/09/2018 95  70 - 99 mg/dL Final    BUN 08/09/2018 23  8 - 23 mg/dL Final    CREATININE 08/09/2018 1.06  0.70 - 1.20 mg/dL Final    Bun/Cre Ratio 08/09/2018 22* 9 - 20 Final    Calcium 08/09/2018 9.4  8.6 - 10.4 mg/dL Final    Sodium 08/09/2018 143  135 - 144 mmol/L Final    Potassium 08/09/2018 4.2  3.7 - 5.3 mmol/L Final    Chloride 08/09/2018 105  98 - 107 mmol/L Final    CO2 08/09/2018 28  20 - 31 mmol/L Final    Anion Gap 08/09/2018 10  9 - 17 mmol/L Final    GFR Non- 08/09/2018 >60  >60 mL/min Final    GFR  08/09/2018 >60  >60 mL/min Final    GFR Comment 08/09/2018        Final    Comment: Average GFR for 79or more years old:   76 mL/min/1.73sq m  Chronic Kidney Disease:   <60 mL/min/1.73sq m  Kidney failure:   <15 mL/min/1.73sq m              eGFR calculated using average adult body mass.  Additional eGFR calculator   available at:        [email or web address]            GFR Staging 08/09/2018 NOT REPORTED   Final    WBC 08/09/2018 9.6  3.5 - 11.0 k/uL Final    RBC 08/09/2018 5.36  4.5 - 5.9 m/uL Final    Hemoglobin 08/09/2018 12.1* 13.5 - 17.5 g/dL Final    Hematocrit 08/09/2018 38.2* 41 - 53 % Final    MCV 08/09/2018 71.2* 80 - 100 fL Final    MCH 08/09/2018 22.6* 26 - 34 pg Final    MCHC 08/09/2018 31.8  31 - 37 g/dL Final    RDW 08/09/2018 20.6* 11.0 - 14.5 % Final    Platelets 60/68/8651 262  140 - 450 k/uL Final    MPV 08/09/2018 7.5  6.0 - 12.0 fL Final    NRBC Automated 08/09/2018 NOT REPORTED  per 100 WBC Final    Differential Type 08/09/2018 NOT REPORTED   Final    Immature Granulocytes 08/09/2018 NOT REPORTED  0 % Final    Absolute Immature Granulocyte 08/09/2018 NOT REPORTED  0.00 - 0.30 k/uL Final    WBC Morphology 08/09/2018 NOT REPORTED   Final    RBC Morphology 08/09/2018 NOT REPORTED   Final    Platelet Estimate No

## 2024-01-09 NOTE — ED PROVIDER NOTE - PATIENT PORTAL LINK FT
You can access the FollowMyHealth Patient Portal offered by  by registering at the following website: [email or web address]. By joining [employer]’s FollowMyHealth portal, you will also be able to view your health information using other applications (apps) compatible with our system. You can access the FollowMyHealth Patient Portal offered by [facility] by registering at the following website: [email or web address]. By joining [employer]’s FollowMyHealth portal, you will also be able to view your health information using other applications (apps) compatible with our system.

## 2024-01-09 NOTE — ED PROVIDER NOTE - RAPID ASSESSMENT
73 F PMHx of DM and HLD, from [place] arrived 10/15/23, presents c/o dizziness x4 days after running out of her atorvastatin and Jardiance duo. pt also notes L lumbar region back pain x 4days. Pt denies rash, f/c, dysuria, hematuria, n/v, diarrhea, melena, hematochezia, CP, SOB.  Patient reports she sees a physician in December who prescribed medication but it was too expensive for her to fill some has been taking remaining medication from [place] which she recently ran out of.  Manas, ID# [id number].    Patient was seen as a rapid assessment (QPA) patient. The patient will be seen and further worked up in the main emergency department and their care will be completed by the main emergency department team along with a thorough physical exam. Receiving team will follow up on labs, analgesia, any clinical imaging, reassess and disposition as clinically indicated, all decisions regarding the progression of care will be made at their discretion. - Danis Tracey PA-C 73 F PMHx of DM and HLD, from [place] arrived 10/15/23, presents c/o dizziness x4 days after running out of her atorvastatin and Jardiance duo. pt also notes L lumbar region back pain x 4days. Pt denies rash, f/c, dysuria, hematuria, n/v, diarrhea, melena, hematochezia, CP, SOB.  Patient reports she sees a physician in December who prescribed medication but it was too expensive for her to fill some has been taking remaining medication from [place] which she recently ran out of.  Manas, ID# [id number].    Patient was seen as a rapid assessment (QPA) patient. The patient will be seen and further worked up in the main emergency department and their care will be completed by the main emergency department team along with a thorough physical exam. Receiving team will follow up on labs, analgesia, any clinical imaging, reassess and disposition as clinically indicated, all decisions regarding the progression of care will be made at their discretion. - Danis Tracey PA-C

## 2024-01-09 NOTE — ED PROVIDER NOTE - CLINICAL SUMMARY MEDICAL DECISION MAKING FREE TEXT BOX
73-year-old female concerning for stress headaches low back pain with tenderness in the gluteus after carrying grandson for the past 2 months concerning for musculoskeletal strain.  Patient hyperglycemic without signs of DKA to be trailed on metformin and followed up with the outpatient clinic.  Patient will be symptomatically treated and discharged.

## 2024-01-09 NOTE — ED PROVIDER NOTE - OBJECTIVE STATEMENT
73-year-old female PMH diabetes hyperlipidemia presenting with headache and dizziness  Patient states she has been having headache dizziness for the past 4 days after running out of her diabetes medications Jardiance and atorvastatin for hyperlipidemia.  Patient describes dizziness as she feels like she is spinning in her room and she does not visibly see the room spinning.  It comes on and she ambulates at times but not always patient denies neurological deficits weakness gait ataxia vision changes.  Patient states headache is occipital and the pain comes on to cervical muscles.  Patient also states that since she has been here she has been unable to get a refill on her medications.  Patient has a there is any way we can refill her medications.  Patient endorses dysuria denies vaginal discharge.  Patient denies chest pain shortness of breath palpitations nausea vomiting diarrhea fevers chills. 73-year-old female PMH diabetes hyperlipidemia presenting with headache and dizziness  Patient states she has been having headache dizziness for the past 4 days after running out of her diabetes medications Jardiance and atorvastatin for hyperlipidemia.  Patient describes dizziness as she feels like she is spinning in her room and she does not visibly see the room spinning.  It comes on and she ambulates at times but not always patient denies neurological deficits weakness gait ataxia vision changes.  Patient states headache is occipital and the pain comes on to cervical muscles.  Patient also states that since she has been here she has been unable to get a refill on her medications.  Patient has a there is any way we can refill her medications.  Patient endorses dysuria denies vaginal discharge.  Patient denies chest pain shortness of breath palpitations nausea vomiting diarrhea fevers chills.    Attendinyo female presents with dizziness intermittently since running out of her medication.  pt is from [facility] and is on jiardiance and ran out of her diabetes medication.  no fever or chills.  tolerating po well. 73-year-old female PMH diabetes hyperlipidemia presenting with headache and dizziness  Patient states she has been having headache dizziness for the past 4 days after running out of her diabetes medications Jardiance and atorvastatin for hyperlipidemia.  Patient describes dizziness as she feels like she is spinning in her room and she does not visibly see the room spinning.  It comes on and she ambulates at times but not always patient denies neurological deficits weakness gait ataxia vision changes.  Patient states headache is occipital and the pain comes on to cervical muscles.  Patient also states that since she has been here she has been unable to get a refill on her medications.  Patient has a there is any way we can refill her medications.  Patient endorses dysuria denies vaginal discharge.  Patient denies chest pain shortness of breath palpitations nausea vomiting diarrhea fevers chills.    Attendinyo female presents with dizziness intermittently since running out of her medication.  pt is from [facility] and is on jiardiance and ran out of her diabetes medication.  no fever or chills.  tolerating po well.

## 2024-01-09 NOTE — ED ADULT NURSE NOTE - CHIEF COMPLAINT QUOTE
ran out of Jardiance and Atorvastatin 4 days ago. reports L flank pain, dizziness and weakness x 4 days. denies fever, chills, HA, urinary symptoms. .

## 2024-01-09 NOTE — ED PROVIDER NOTE - NSFOLLOWUPINSTRUCTIONS_ED_ALL_ED_FT
Por favor Le pike visto en Urgencias por mareos.  Todos cristal análisis de stephanie de hoy estaban dentro de los límites normales.  El análisis de orina no muestra signos de infección.  He enviado a wells farmacia las recetas de metformina y atorvastatina.  Alguien le llamará para ayudarle a concertar ezio nicholas con un médico de atención primaria para el seguimiento continuado de cristal problemas médicos.  Si desarrolla algún síntoma nuevo o que empeora, gabriele dolor torácico, dificultad para respirar, vómitos, fiebre, o si tiene algún otro motivo de preocupación, vuelva a Urgencias de inmediato para que le vuelvan a evaluar.     You were seen in the ER for dizziness.  All of your blood work today was within normal limits.  Your urine testing did not show any signs of an infection in your urine.  I sent refills of your prescriptions for metformin and atorvastatin to your pharmacy.  Someone will call you to help you schedule an appointment with a primary care doctor for ongoing follow-up for your medical problems.  If you develop any new or worsening symptoms including chest pain, shortness of breath, vomiting, fevers, or if you are otherwise concerned, come back to the ER right away to be re-evaluated.

## 2024-01-09 NOTE — ED ADULT NURSE NOTE - NSFALLUNIVINTERV_ED_ALL_ED
Bed/Stretcher in lowest position, wheels locked, appropriate side rails in place/Call bell, personal items and telephone in reach/Instruct patient to call for assistance before getting out of bed/chair/stretcher/Non-slip footwear applied when patient is off stretcher/[place] to call system/Physically safe environment - no spills, clutter or unnecessary equipment/Purposeful proactive rounding/Room/bathroom lighting operational, light cord in reach Bed/Stretcher in lowest position, wheels locked, appropriate side rails in place/Call bell, personal items and telephone in reach/Instruct patient to call for assistance before getting out of bed/chair/stretcher/Non-slip footwear applied when patient is off stretcher/[place] to call system/Physically safe environment - no spills, clutter or unnecessary equipment/Purposeful proactive rounding/Room/bathroom lighting operational, light cord in reach

## 2024-01-09 NOTE — ED ADULT TRIAGE NOTE - CHIEF COMPLAINT QUOTE
ran out of Jardiance and Atorvastatin 4 days ago. reports dizziness and weakness x 4 days. denies fever, chills, HA.  ran out of Jardiance and Atorvastatin 4 days ago. reports L flank pain, dizziness and weakness x 4 days. denies fever, chills, HA, urinary symptoms. .

## 2024-01-10 LAB
CULTURE RESULTS: SIGNIFICANT CHANGE UP
CULTURE RESULTS: SIGNIFICANT CHANGE UP
SPECIMEN SOURCE: SIGNIFICANT CHANGE UP
SPECIMEN SOURCE: SIGNIFICANT CHANGE UP

## 2024-01-23 ENCOUNTER — APPOINTMENT (OUTPATIENT)
Dept: INTERNAL MEDICINE | Facility: CLINIC | Age: 74
End: 2024-01-23

## 2024-09-23 NOTE — ED ADULT TRIAGE NOTE - WEIGHT METHOD
Colonoscopy order received, call pt's clinic [facility] to have most current office visit note,med list and lab fax to [phone]. Writer was transferred to another department/line. Left VM to have office fax requested information over, fax request also sent out to [phone].    Left  for pt awaiting notes from provider's office once chart review is completed one of our  will reach out to patient. If pt has questions she can contact our office at [phone]   stated

## 2024-11-26 ENCOUNTER — EMERGENCY (EMERGENCY)
Facility: HOSPITAL | Age: 74
LOS: 1 days | Discharge: HOME | End: 2024-11-26
Attending: EMERGENCY MEDICINE
Payer: MEDICAID

## 2024-11-26 VITALS
WEIGHT: 126.99 LBS | TEMPERATURE: 98 F | SYSTOLIC BLOOD PRESSURE: 144 MMHG | HEART RATE: 69 BPM | DIASTOLIC BLOOD PRESSURE: 77 MMHG | HEIGHT: 63.78 IN | RESPIRATION RATE: 20 BRPM | OXYGEN SATURATION: 95 %

## 2024-11-26 LAB
ALBUMIN SERPL ELPH-MCNC: 4.4 G/DL — SIGNIFICANT CHANGE UP (ref 3.3–5)
ALP SERPL-CCNC: 94 U/L — SIGNIFICANT CHANGE UP (ref 40–120)
ALT FLD-CCNC: 21 U/L — SIGNIFICANT CHANGE UP (ref 10–45)
ANION GAP SERPL CALC-SCNC: 11 MMOL/L — SIGNIFICANT CHANGE UP (ref 5–17)
APTT BLD: 29.1 SEC — SIGNIFICANT CHANGE UP (ref 24.5–35.6)
AST SERPL-CCNC: 20 U/L — SIGNIFICANT CHANGE UP (ref 10–40)
BASOPHILS # BLD AUTO: 0.05 K/UL — SIGNIFICANT CHANGE UP (ref 0–0.2)
BASOPHILS NFR BLD AUTO: 1.2 % — SIGNIFICANT CHANGE UP (ref 0–2)
BILIRUB SERPL-MCNC: 0.6 MG/DL — SIGNIFICANT CHANGE UP (ref 0.2–1.2)
BLD GP AB SCN SERPL QL: NEGATIVE — SIGNIFICANT CHANGE UP
BUN SERPL-MCNC: 32 MG/DL — HIGH (ref 7–23)
CALCIUM SERPL-MCNC: 9.8 MG/DL — SIGNIFICANT CHANGE UP (ref 8.4–10.5)
CHLORIDE SERPL-SCNC: 101 MMOL/L — SIGNIFICANT CHANGE UP (ref 96–108)
CK SERPL-CCNC: 170 U/L — SIGNIFICANT CHANGE UP (ref 25–170)
CO2 SERPL-SCNC: 26 MMOL/L — SIGNIFICANT CHANGE UP (ref 22–31)
CREAT SERPL-MCNC: 1.1 MG/DL — SIGNIFICANT CHANGE UP (ref 0.5–1.3)
EGFR: 53 ML/MIN/1.73M2 — LOW
EOSINOPHIL # BLD AUTO: 0.15 K/UL — SIGNIFICANT CHANGE UP (ref 0–0.5)
EOSINOPHIL NFR BLD AUTO: 3.5 % — SIGNIFICANT CHANGE UP (ref 0–6)
FLUAV AG NPH QL: SIGNIFICANT CHANGE UP
FLUBV AG NPH QL: SIGNIFICANT CHANGE UP
GAS PNL BLDV: SIGNIFICANT CHANGE UP
GLUCOSE SERPL-MCNC: 112 MG/DL — HIGH (ref 70–99)
HCT VFR BLD CALC: 44.5 % — SIGNIFICANT CHANGE UP (ref 34.5–45)
HGB BLD-MCNC: 14.8 G/DL — SIGNIFICANT CHANGE UP (ref 11.5–15.5)
IMM GRANULOCYTES NFR BLD AUTO: 0.2 % — SIGNIFICANT CHANGE UP (ref 0–0.9)
INR BLD: 0.95 RATIO — SIGNIFICANT CHANGE UP (ref 0.85–1.16)
LIDOCAIN IGE QN: 68 U/L — HIGH (ref 7–60)
LYMPHOCYTES # BLD AUTO: 1.54 K/UL — SIGNIFICANT CHANGE UP (ref 1–3.3)
LYMPHOCYTES # BLD AUTO: 36.3 % — SIGNIFICANT CHANGE UP (ref 13–44)
MCHC RBC-ENTMCNC: 29.5 PG — SIGNIFICANT CHANGE UP (ref 27–34)
MCHC RBC-ENTMCNC: 33.3 G/DL — SIGNIFICANT CHANGE UP (ref 32–36)
MCV RBC AUTO: 88.6 FL — SIGNIFICANT CHANGE UP (ref 80–100)
MONOCYTES # BLD AUTO: 0.5 K/UL — SIGNIFICANT CHANGE UP (ref 0–0.9)
MONOCYTES NFR BLD AUTO: 11.8 % — SIGNIFICANT CHANGE UP (ref 2–14)
NEUTROPHILS # BLD AUTO: 1.99 K/UL — SIGNIFICANT CHANGE UP (ref 1.8–7.4)
NEUTROPHILS NFR BLD AUTO: 47 % — SIGNIFICANT CHANGE UP (ref 43–77)
NRBC # BLD: 0 /100 WBCS — SIGNIFICANT CHANGE UP (ref 0–0)
PLATELET # BLD AUTO: 199 K/UL — SIGNIFICANT CHANGE UP (ref 150–400)
POTASSIUM SERPL-MCNC: 4.4 MMOL/L — SIGNIFICANT CHANGE UP (ref 3.5–5.3)
POTASSIUM SERPL-SCNC: 4.4 MMOL/L — SIGNIFICANT CHANGE UP (ref 3.5–5.3)
PROT SERPL-MCNC: 7.7 G/DL — SIGNIFICANT CHANGE UP (ref 6–8.3)
PROTHROM AB SERPL-ACNC: 10.8 SEC — SIGNIFICANT CHANGE UP (ref 9.9–13.4)
RBC # BLD: 5.02 M/UL — SIGNIFICANT CHANGE UP (ref 3.8–5.2)
RBC # FLD: 14.4 % — SIGNIFICANT CHANGE UP (ref 10.3–14.5)
RH IG SCN BLD-IMP: POSITIVE — SIGNIFICANT CHANGE UP
RSV RNA NPH QL NAA+NON-PROBE: SIGNIFICANT CHANGE UP
SARS-COV-2 RNA SPEC QL NAA+PROBE: SIGNIFICANT CHANGE UP
SODIUM SERPL-SCNC: 138 MMOL/L — SIGNIFICANT CHANGE UP (ref 135–145)
WBC # BLD: 4.24 K/UL — SIGNIFICANT CHANGE UP (ref 3.8–10.5)
WBC # FLD AUTO: 4.24 K/UL — SIGNIFICANT CHANGE UP (ref 3.8–10.5)

## 2024-11-26 PROCEDURE — 71045 X-RAY EXAM CHEST 1 VIEW: CPT | Mod: 26

## 2024-11-26 PROCEDURE — 99285 EMERGENCY DEPT VISIT HI MDM: CPT

## 2024-11-26 RX ORDER — ACETAMINOPHEN 500 MG
1000 TABLET ORAL ONCE
Refills: 0 | Status: COMPLETED | OUTPATIENT
Start: 2024-11-26 | End: 2024-11-26

## 2024-11-26 RX ADMIN — Medication 400 MILLIGRAM(S): at 23:01

## 2024-11-26 NOTE — ED PROVIDER NOTE - NSFOLLOWUPINSTRUCTIONS_ED_ALL_ED_FT
You were seen in the emergency department for chest wall pain. Your evaluation shows that you have two fractured ribs on the right. The results of your labs and imaging are included in this paperwork. Please bring it with you to any follow up appointments you have.    Follow up with your primary care next week for further evaluation and management of your symptoms. You may need a repeat X-ray in a few weeks to see how your ribs are healing.    I have notified someone in our system to contact you within the next 2-3 business days to help set up an appointment with an CARDIOLOGIST and OBGYN. Please schedule that appointment as soon as you are able to. If you do not hear from them in the next few business days, please call the number listed on the first page of your discharge paperwork.    While at home, continue with the breathing machine (incentive spirometry) to help ensure that your lungs are being exercised, as sometimes rib fractures can put patients at higher risk of lung infections.     UNLESS OTHERWISE DIRECTED BY YOUR PRIMARY DOCTOR, for discomfort at home, you can alternate between 600mg ibuprofen (Motrin, Alleve, Advil, etc.) and 650-975mg acetaminophen (Tylenol) every 6-8 hours. If your pain is severe, you can take them both together at the same time. Please do not exceed 3200mg ibuprofen or 4000mg Tylenol in one day. Please consult with your primary doctor before taking any medications on a regular basis.    Return to the ER if you experience severe chest pain, trouble breathing, severe pain with breathing, persistent coughing, fevers, chills, lightheadedness, dizziness, or any other new or concerning symptoms.    ----TRANSLATED VIA Floored----  Lo atendieron en el departamento de emergencias por dolor en la pared torácica. Wells evaluación muestra que tiene dos costillas fracturadas en el lado derecho. Los resultados de cristal análisis de laboratorio y de imágenes están incluidos en jose papeleo. Por favor, tráigalo a cualquier nicholas de seguimiento que tenga.    Programe ezio nicholas de seguimiento con wells médico de cabecera la próxima semana para ezio evaluación y manejo adicionales de cristal síntomas. Es posible que necesite ezio radiografía repetida en unas pocas semanas para ronak cómo se están curando cristal costillas.    He notificado a alguien en nuestro sistema para que se comunique con usted dentro de los próximos 2 a 3 días hábiles para ayudarlo a programar ezio nicholas con un CARDIÓLOGO y un GINECÓLOGO. Por favor, programe nieyc nicholas tan pronto gabriele pueda. Si no tiene noticias de ellos en los próximos días hábiles, llame al número que figura en la primera página de wells documentación de ibrahima.    Mientras esté en casa, continúe con la máquina de respiración (espirómetro de incentivo) para ayudar a asegurar que cristal pulmones estén siendo ejercitados, ya que a veces las fracturas de costillas pueden poner a los pacientes en mayor riesgo de infecciones pulmonares.    A MENOS QUE WELLS MÉDICO DE CABECERA LE INDIQUE LO CONTRARIO, para las molestias en casa, puede alternar entre 600 mg de ibuprofeno (Motrin, Alleve, Advil, etc.) y 650-975 mg de acetaminofén (Tylenol) cada 6-8 horas. Si wells dolor es severo, puede will ambos juntos al mismo tiempo. No exceda los 3200 mg de ibuprofeno o 4000 mg de Tylenol en un día. Consulte con wells médico de cabecera antes de will cualquier medicamento de forma regular.    Regrese a la keaton de emergencias si experimenta dolor de pecho intenso, dificultad para respirar, dolor intenso al respirar, tos persistente, fiebre, escalofríos, aturdimiento, mareos o cualquier otro síntoma nuevo o preocupante.

## 2024-11-26 NOTE — ED ADULT NURSE NOTE - OBJECTIVE STATEMENT
pt is a 73yo female PMH DM presenting to the ED complaining of fall. pt Beninese speaking,  Nataliya [id number] used to facilitate conversation. pt reports fall yesterday after standing from sitting height, states "I stood up and my whole body went out from under me", pt reports falling forward, denies HS, no LOC, no AC, denies any dizziness prior to fall, pt recalls entire event. pt currently endorsing pain to R knee, pain over R ribs, pt referred to ED by MD for r/o rib fx. on exam, pt R knee appears swollen, tender to touch, some ecchymosis is apparent. pt A&Ox3 gross neuro intact, no difficulty speaking in complete sentences, pulses x 4, figueroa x4, abdomen soft nontender nondistended, skin intact. pt denies chest pain, sob, ha, n/v/d, abdominal pain, f/c, urinary symptoms, hematuria

## 2024-11-26 NOTE — ED PROVIDER NOTE - EKG #1 DATE/TIME
What Type Of Note Output Would You Prefer (Optional)?: Standard Output
Hpi Title: Evaluation of Skin Lesions
27-Nov-2024 22:52
How Severe Are Your Spot(S)?: mild
Have Your Spot(S) Been Treated In The Past?: has not been treated

## 2024-11-26 NOTE — ED PROVIDER NOTE - CLINICAL SUMMARY MEDICAL DECISION MAKING FREE TEXT BOX
EM PGY-2/[place], DO: 75 y/o F PMHx HTN, HLD, DM presents to ED sent my PMD for R sided rib and upper abdominal pain iso GLF earlier this AM. Pt sts she was getting up from a chair, fell forward, attempted to catch herself but landed on her bottom and hit her R chest wall and R knee. Denies HS, LOC. Not on AC. She endorses slight pain with deep breath however no pain at rest, mostly with movement. Denies any current chest pain. Denies any preceding or current chest pain, lightheadedness, dizziness, palpitations, SOB. Also has R knee bruise with no active bleeding, some pain but full ROM, has been able to walk without difficulties. Further denies any abd pain, NVD, HA, vision changes, URI symptoms, fevers, chills. Endorses regular PO intake, voids and BMs. PMD sent for concern of hepatic injury. No other complaints. Pt Sudanese speaking, attending Dr. Kc served as  for the encounter. VSS. On exam pt is well appearing in NAD, nontoxic appearing, speaks in full sentences with appropriate phonation. Head NC/AT. EOMI, conjunctiva/sclera clr. Neck FROM with no midline ttp or step off to C/T/L spine, +B/L trap TTP. CV RRR, no MGR or JVD appreciated. +R ribcage ttp around ribs ~9-11, very minimal crepitus palpable. Lungs CTAB, no increased WOB, no accessory m. use. Abd soft, +RUQ ttp with guarding, no over peritoneal signs, nondistended abd. Extremities with varicose veins. Small hematoma to R patella region, no active bleeding, FROM intact, able to bear weight with no gait disturbances. No focal neuro deficits. No other visible rashes or bruising. Mood and affect congruent. DDx rib fx, will evaluate for PTX, evaluate abd for hepatic injury vs. other blunt abdominal injury. Orders reflect ddx. Analgesia ordered. Reeval to dispo.

## 2024-11-26 NOTE — ED PROVIDER NOTE - NSPTACCESSSVCSAPPTDETAILS_ED_ALL_ED_FT
has 4.2 aortic aneurysm, needs CARDIOLOGY f/u. also needs OBGYN for pelvic congestion seen on CT has 4.2 aortic aneurysm, needs CARDIOLOGY f/u.   also needs OBGYN for pelvic congestion seen on CT  needs PMD for uncontrolled DM and rib fracture follow up, preferably Amharic speaking as she only speaks Tajik

## 2024-11-26 NOTE — ED PROVIDER NOTE - PATIENT PORTAL LINK FT
You can access the FollowMyHealth Patient Portal offered by [facility] by registering at the following website: [email or web address]. By joining [facility]’s FollowMyHealth portal, you will also be able to view your health information using other applications (apps) compatible with our system.

## 2024-11-26 NOTE — ED PROVIDER NOTE - ATTENDING CONTRIBUTION TO CARE
Attending MD Kc: I personally have seen and examined this patient.  Resident note reviewed and agree on plan of care and except where noted.  See below for details.     Seen in [place], accompanied by son in law, interviewed in Italian    74F with PMH/PSH including HTN, HLD, DM presents to the ED with R sided rib pain s/p fall.  Reports was standing after sitting for a while and when she stood, she fell like she was going to fall forward to tried to avoid hitting her face and was trying to redirect her fall.  Reports ended up hitting the R side of chest and hitting medial aspect of R knee.  Reports has been ambulatory since incident.  Denies preceding dizziness, weakness, sensory changes.  Denies LOC, head strike.  Denies loss of urinary or bowel continence. Denies numbness, weakness or tingling in extremities. Denies shortness of breath, palpitations. Denies abdominal pain, nausea, vomiting, diarrhea, bloody or black stools. Denies dysuria, hematuria, change in urinary habits including frequency, urgency. Denies recent illness, fevers.    Trauma Assessment:  A - airway patent, speaking clearly  B - symmetrical chest rise, no increased work of breathing, breath sounds bilaterally  C - no active bleeding, skin warm and dry  D - GCS 15, PERRL  E - exposed     Exam:   General: NAD  HENT: head NCAT, airway patent  Eyes: anicteric, no conjunctival injection   Chest: lungs CTAB with good inspiratory effort, no wheezing, no rhonchi, no rales, +tenderness to R rib under R breast anterolaterally, no flail chest, mild overlying ecchymosis  Cardiac: +S1S2, no obvious m/r/g  GI: abdomen soft with +BS, +RUQ tenderness at area close to lower rib margin, no rebound, no guarding, ND  : no CVAT  MSK: ranging neck and extremities freely, no midline spine tenderness  Neuro: moving all extremities spontaneously, nonfocal  Psych: normal mood and affect     A/P: 74F with R chest pain, will obtain CT Chest to eval for rib fractures, CTAP given pain will eval for liver injury although low suspcion as patient appears nontoxic, is moving freely and is completely nontender elsewhere in the abdomen, will obtain labs, CXR, will give analgesia

## 2024-11-26 NOTE — ED ADULT TRIAGE NOTE - CHIEF COMPLAINT QUOTE
fall from sitting yesterday, denies feeling dizzy before fall, referred to ED by primary for right rib fracture and r/o hepatic lac

## 2024-11-26 NOTE — ED PROVIDER NOTE - INCLUDE COVID-19 DISCHARGE INSTRUCTIONS
Rx Authorization:    Requested Medication/ Dose: ClonazePam 0.5 MG tabs    Date last refill ordered: 7/29/22    Quantity ordered: 30 tabs    # refills: 3    Date of last clinic visit with ordering provider: 7/29/22    Date of next clinic visit with ordering provider: F/U 6 months    All pertinent protocol data (lab date/result):     Include pertinent information from patients message:     
<-------- Click here to INCLUDE CoVID-19 Discharge Instructions

## 2024-11-26 NOTE — ED PROVIDER NOTE - PROGRESS NOTE DETAILS
EM PGY-2/[facility] DO: Pt reevaluated, seen walking around in NAD. On incentive spirometry pt pulling volumes between 9987-9678 3-4x consistently. Does not appear uncomfortable during. EM PGY-2/[place] DO: Via  #[id number], had extensive conversational regarding results, incidental findings, f/u, return precautions, and answered all questions. Recommended OBGYN f/u for pelvic congestion on CTAP, Cardiology f/u for aortic aneurysm, and PMD f/u for rib fx and glucosuria. Explained tylenol/ibuprofen use at home for pain and continuing incentive spirometry. Pt expressed undestanding and agreement to plan and is amenable to d/c.

## 2024-11-26 NOTE — ED PROVIDER NOTE - NSICDXPASTMEDICALHX_GEN_ALL_CORE_FT
PAST MEDICAL HISTORY:  Diabetes     Diabetes mellitus     HLD (hyperlipidemia)     Varicose vein

## 2024-11-26 NOTE — ED PROVIDER NOTE - PHYSICAL EXAMINATION
well appearing in NAD, nontoxic appearing, speaks in full sentences with appropriate phonation.   Head NC/AT. EOMI, conjunctiva/sclera clr.   Neck FROM with no midline ttp or step off to C/T/L spine, +B/L trap TTP.   CV RRR, no MGR or JVD appreciated. +R ribcage ttp around ribs ~9-11, very minimal crepitus palpable.   Lungs CTAB, no increased WOB, no accessory m. use.   Abd soft, +RUQ ttp with guarding, no over peritoneal signs, nondistended abd.   Extremities with varicose veins. Small hematoma to R patella region, no active bleeding, FROM intact, able to bear weight with no gait disturbances. Moving all other extremities and joints with full ROM.  No focal neuro deficits. No other visible rashes or bruising.   Mood and affect congruent.

## 2024-11-27 VITALS
OXYGEN SATURATION: 96 % | RESPIRATION RATE: 16 BRPM | TEMPERATURE: 98 F | HEART RATE: 65 BPM | SYSTOLIC BLOOD PRESSURE: 126 MMHG | DIASTOLIC BLOOD PRESSURE: 69 MMHG

## 2024-11-27 PROBLEM — E78.5 HYPERLIPIDEMIA, UNSPECIFIED: Chronic | Status: ACTIVE | Noted: 2024-01-09

## 2024-11-27 PROBLEM — E11.9 TYPE 2 DIABETES MELLITUS WITHOUT COMPLICATIONS: Chronic | Status: ACTIVE | Noted: 2024-01-09

## 2024-11-27 LAB
APPEARANCE UR: CLEAR — SIGNIFICANT CHANGE UP
BACTERIA # UR AUTO: NEGATIVE /HPF — SIGNIFICANT CHANGE UP
BILIRUB UR-MCNC: NEGATIVE — SIGNIFICANT CHANGE UP
CAST: 0 /LPF — SIGNIFICANT CHANGE UP (ref 0–4)
COLOR SPEC: YELLOW — SIGNIFICANT CHANGE UP
DIFF PNL FLD: NEGATIVE — SIGNIFICANT CHANGE UP
GLUCOSE UR QL: >=1000 MG/DL
KETONES UR-MCNC: NEGATIVE MG/DL — SIGNIFICANT CHANGE UP
LEUKOCYTE ESTERASE UR-ACNC: NEGATIVE — SIGNIFICANT CHANGE UP
NITRITE UR-MCNC: NEGATIVE — SIGNIFICANT CHANGE UP
PH UR: 6 — SIGNIFICANT CHANGE UP (ref 5–8)
PROT UR-MCNC: NEGATIVE MG/DL — SIGNIFICANT CHANGE UP
RBC CASTS # UR COMP ASSIST: 1 /HPF — SIGNIFICANT CHANGE UP (ref 0–4)
SP GR SPEC: >1.03 — HIGH (ref 1–1.03)
SQUAMOUS # UR AUTO: 0 /HPF — SIGNIFICANT CHANGE UP (ref 0–5)
UROBILINOGEN FLD QL: 0.2 MG/DL — SIGNIFICANT CHANGE UP (ref 0.2–1)
WBC UR QL: 1 /HPF — SIGNIFICANT CHANGE UP (ref 0–5)

## 2024-11-27 PROCEDURE — 83690 ASSAY OF LIPASE: CPT

## 2024-11-27 PROCEDURE — 74177 CT ABD & PELVIS W/CONTRAST: CPT | Mod: MC

## 2024-11-27 PROCEDURE — 84132 ASSAY OF SERUM POTASSIUM: CPT

## 2024-11-27 PROCEDURE — 85610 PROTHROMBIN TIME: CPT

## 2024-11-27 PROCEDURE — 86900 BLOOD TYPING SEROLOGIC ABO: CPT

## 2024-11-27 PROCEDURE — 93005 ELECTROCARDIOGRAM TRACING: CPT

## 2024-11-27 PROCEDURE — 96374 THER/PROPH/DIAG INJ IV PUSH: CPT

## 2024-11-27 PROCEDURE — 85025 COMPLETE CBC W/AUTO DIFF WBC: CPT

## 2024-11-27 PROCEDURE — 80053 COMPREHEN METABOLIC PANEL: CPT

## 2024-11-27 PROCEDURE — 82803 BLOOD GASES ANY COMBINATION: CPT

## 2024-11-27 PROCEDURE — 87637 SARSCOV2&INF A&B&RSV AMP PRB: CPT

## 2024-11-27 PROCEDURE — 81001 URINALYSIS AUTO W/SCOPE: CPT

## 2024-11-27 PROCEDURE — 83605 ASSAY OF LACTIC ACID: CPT

## 2024-11-27 PROCEDURE — 86850 RBC ANTIBODY SCREEN: CPT

## 2024-11-27 PROCEDURE — 85014 HEMATOCRIT: CPT

## 2024-11-27 PROCEDURE — 86901 BLOOD TYPING SEROLOGIC RH(D): CPT

## 2024-11-27 PROCEDURE — 99285 EMERGENCY DEPT VISIT HI MDM: CPT | Mod: 25

## 2024-11-27 PROCEDURE — 71260 CT THORAX DX C+: CPT | Mod: 26,MC

## 2024-11-27 PROCEDURE — 85730 THROMBOPLASTIN TIME PARTIAL: CPT

## 2024-11-27 PROCEDURE — 84295 ASSAY OF SERUM SODIUM: CPT

## 2024-11-27 PROCEDURE — 85018 HEMOGLOBIN: CPT

## 2024-11-27 PROCEDURE — 74177 CT ABD & PELVIS W/CONTRAST: CPT | Mod: 26,MC

## 2024-11-27 PROCEDURE — 82947 ASSAY GLUCOSE BLOOD QUANT: CPT

## 2024-11-27 PROCEDURE — 82435 ASSAY OF BLOOD CHLORIDE: CPT

## 2024-11-27 PROCEDURE — 36415 COLL VENOUS BLD VENIPUNCTURE: CPT

## 2024-11-27 PROCEDURE — 82330 ASSAY OF CALCIUM: CPT

## 2024-11-27 PROCEDURE — 71260 CT THORAX DX C+: CPT | Mod: MC

## 2024-11-27 PROCEDURE — 82550 ASSAY OF CK (CPK): CPT

## 2024-11-27 PROCEDURE — 71045 X-RAY EXAM CHEST 1 VIEW: CPT

## 2024-11-27 NOTE — ED ADULT NURSE REASSESSMENT NOTE - NS ED NURSE REASSESS COMMENT FT1
pt educated on use of incentive spirometer via  (Rona [id number]), pt reached 1500 x3, MD Arreola notified. pt educated on use of incentive spirometer via  (Rona [id number]), pt reached 500-1000 x3, MD Arreola notified. pt educated on use of incentive spirometer via  (Rona [id number]), pt reached 1000-1500 x3, MD Arreola notified.

## 2024-12-06 ENCOUNTER — APPOINTMENT (OUTPATIENT)
Dept: CARDIOLOGY | Facility: CLINIC | Age: 74
End: 2024-12-06

## 2025-01-02 NOTE — ED PROVIDER NOTE - PRO INTERPRETER NEED 2
[Time Spent: ___ minutes] : I have spent [unfilled] minutes of time on the encounter which excludes teaching and separately reported services. Icelandic